# Patient Record
(demographics unavailable — no encounter records)

---

## 2024-11-14 NOTE — PHYSICAL EXAM

## 2024-11-14 NOTE — HISTORY OF PRESENT ILLNESS
[FreeTextEntry1] : Chief complaint: abdominal discomfort, constipation, dyspepsia   HPI: Patient presents for evaluation and management of multiple complex Gastrointestinal signs and symptoms. Patient with postprandial abdominal distension and discomfort which is without radiation; no exacerbating or ameliorating factors. Associated factors include dyspepsia. Patient without dysphagia or odynophagia.   Most likely patient with acute exacerbation of spastic colitis.   Multiple reports reviewed with patient including colonoscopy report and histopathology report   New prescription for IBS rela 50 mg orally daily   High fiber diet reviewed with patient  Moderate degree of complexity of medical decision making involved in this patient encounter

## 2024-11-14 NOTE — ASSESSMENT
[FreeTextEntry1] : Patient presents for evaluation and management of multiple complex gastrointestinal signs and symptoms   Intermittent crampy abdominal distension and constipation   I reviewed with the patient multiple reports including colonoscopy and histopathology reports  New medication prescribed, ibs-rela  Moderate degree of complexity of medical decision making involved in this patient encounter

## 2025-01-02 NOTE — HISTORY OF PRESENT ILLNESS
[FreeTextEntry1] : Chief complaint: constipation, bloating   HPI:Patient presents for followup of multiple complex gastrointestinal signs and symptoms. Last visit, she was placed on ibs rela for her flare of spastic colitis, and she returns today stating that she feels significantly better.   No sign of acute gastrointestinal bleeding such as hematemesis, melena or hematochezia, no dysphagia or odynophagia   Refill placed for ibs rela   problem list, medications and allergies were reviewed and reconciled

## 2025-01-02 NOTE — ASSESSMENT
[FreeTextEntry1] : Patient presents for followup of multiple complex gastrointestinal signs and symptoms   Doing well on ibs rela therapy   Medications, problem list and allergies were reviewed and reconciled

## 2025-03-18 NOTE — PLAN
[FreeTextEntry1] : Ms. HEMA BARBER 69 year  female  with a PMH osteoporosis on Prolia injection, anxiety, SCC, s/p Mohs surgery, IBS, H/o GBS  present to the office for a physical exam and to establish care with a new PCP.  Well adult exam is performed. Recommend  to do a blood test today, further management will depend on the blood test results.  EKG was done and reviewed  NSR 65 bpm, T inversion in lead V1, no acute st-t changes Anxiety continue effexor ER 150mg BID, xanax 0.5mg BID, inderal ER 80mg qd IBS Ibsrela 50mg BID, f/u with GI Osteoporosis  continue prolia, f/u with rheumatologist Do mammo, dexa test H/o skin cancer, actinic keratosis. -  continue to apply to sun screen lotion - f/u with dermatologist  RTC to f/u in 2 wks. Patient is verbalized understanding

## 2025-03-18 NOTE — REVIEW OF SYSTEMS
[TextEntry] : Constitutional: Denies fever, fatigue,  recent changes in the weight Head: Denies headache, dizziness Eyes: Denies diplopia, tearing or pain, c/o dry eyes Ears: Denies earache, tinnitus, hearing loss Nose: Denies nasal obstruction,  epistaxis, has some runny nose Throat: Denies throat pain Neck: Denies stiffness, muscle tenderness Chest: Denies cough, SOB, wheezing, chest congestion CV: Denies chest pain, palpitation GI: Denies abdominal pain, constipation, heartburn Genitourinary: Denies dysuria, urinary urgency Musculoskeletal: Denies joint pain Neuro: Denies changes in mental status Psychiatric: Denies depressive symptoms, change in sleep habits, changes in thought content

## 2025-03-18 NOTE — HISTORY OF PRESENT ILLNESS
[FreeTextEntry1] : New patient [de-identified] : Ms. HEMA BARBER 69 year  female  with a PMH Prediabetes, hyperlipidemia,  osteoporosis on Prolia injection, anxiety, SCC, s/p Mohs surgery, IBS, H/o GBS  present to the office for a physical exam and to establish care with a new PCP.  Patient feels good, denies complaints at present time.

## 2025-03-18 NOTE — HEALTH RISK ASSESSMENT
[Very Good] : ~his/her~  mood as very good [Yes] : Yes [Monthly or less (1 pt)] : Monthly or less (1 point) [1 or 2 (0 pts)] : 1 or 2 (0 points) [Never (0 pts)] : Never (0 points) [No] : In the past 12 months have you used drugs other than those required for medical reasons? No [Any fall with injury in past year] : Patient reported fall with injury in the past year [Little interest or pleasure doing things] : 1) Little interest or pleasure doing things [Feeling down, depressed, or hopeless] : 2) Feeling down, depressed, or hopeless [0] : 2) Feeling down, depressed, or hopeless: Not at all (0) [PHQ-2 Negative - No further assessment needed] : PHQ-2 Negative - No further assessment needed [Patient reported mammogram was normal] : Patient reported mammogram was normal [Patient reported PAP Smear was normal] : Patient reported PAP Smear was normal [HIV test declined] : HIV test declined [Hepatitis C test declined] : Hepatitis C test declined [None] : None [With Family] : lives with family [# of Members in Household ___] :  household currently consist of [unfilled] member(s) [Retired] : retired [College] : College [] :  [# Of Children ___] : has [unfilled] children [Sexually Active] : sexually active [Feels Safe at Home] : Feels safe at home [Fully functional (bathing, dressing, toileting, transferring, walking, feeding)] : Fully functional (bathing, dressing, toileting, transferring, walking, feeding) [Fully functional (using the telephone, shopping, preparing meals, housekeeping, doing laundry, using] : Fully functional and needs no help or supervision to perform IADLs (using the telephone, shopping, preparing meals, housekeeping, doing laundry, using transportation, managing medications and managing finances) [Smoke Detector] : smoke detector [Carbon Monoxide Detector] : carbon monoxide detector [Safety elements used in home] : safety elements used in home [Seat Belt] :  uses seat belt [Sunscreen] : uses sunscreen [With Patient/Caregiver] : , with patient/caregiver [Designated Healthcare Proxy] : Designated healthcare proxy [Name: ___] : Health Care Proxy's Name: [unfilled]  [Relationship: ___] : Relationship: [unfilled] [Aggressive treatment] : aggressive treatment [I will adhere to the patient's wishes.] : I will adhere to the patient's wishes. [Time Spent: ___ minutes] : Time Spent: [unfilled] minutes [Never] : Never [de-identified] : no [de-identified] : dermatologist, ophtalmologist, rheumatologist, GI, gyn [Audit-CScore] : 1 [de-identified] : walking, playin a pick a ball [de-identified] : regular [de-identified] : suffered a Cole fracture of the R foot [[id number]] : 0 [Change in mental status noted] : No change in mental status noted [Language] : denies difficulty with language [Behavior] : denies difficulty with behavior [Learning/Retaining New Information] : denies difficulty learning/retaining new information [Handling Complex Tasks] : denies difficulty handling complex tasks [Reports changes in hearing] : Reports no changes in hearing [Reports changes in vision] : Reports no changes in vision [Reports changes in dental health] : Reports no changes in dental health [MammogramDate] : 2024 [PapSmearDate] : 02/2025 [BoneDensityDate] : 08/10/2023 [BoneDensityComments] : Osteopenia of  FN T score -2.2, TH T score -1.8, Spine T score -1.1 [ColonoscopyDate] : 10/22/2024 [ColonoscopyComments] : IH, diverticula of the sigmoid colon,   s/p polypectomy(tubular adenoma) [de-identified] : wear reading glasses [AdvancecareDate] : 03/18/2025 [FreeTextEntry4] : Consuelo's phone #[phone]

## 2025-03-18 NOTE — PHYSICAL EXAM
[TextEntry] : Constitutional: Well  developed, well appearing, not in acute distress Head: Normocephalic, no lesions Eyes: PERRLA, conjunctiva is NL, clear Ear: Ear canal is normal, tympanic membrane is intact Nose: Nasal turbinates are NL Throat: Clear, no exudates, no lesions, tonsils are absent Neck: Supple, no masses Chest: Lungs are clear, no rales, no rhonchi, no wheezing Heart: Regular rate, no murmurs, no rubs, no gallops Breast: b/l breast is symmetric, areola and nipple is NL, breast is multinodular, dense. Axillary LN is NL b/l Abdomen: Soft, no tenderness, no masses, bowel sounds are normal : No CVAT Extremities: FROM, no edema, has an OA changes of the fingers on both hands Musculoskeletal: has no tenderness of the spine, ROM is NL Skin: Fair skin, has an age related skin changes. Has an actinic keratotic lesion of the skin on the trunk, upper lower  extremities Neuro: AAO x 3, no focal neurological deficit. Psychiatric: oriented to person, place, and time and insight and judgment were intact.

## 2025-06-19 NOTE — PHYSICAL EXAM
[TextEntry] : Constitutional: Well developed, well appearing, not in acute distress Head: Normocephalic, no lesions Eyes: PERRLA, conjunctiva is NL, clear Ear: Ear canal is normal, tympanic membrane is intact Nose: Nasal turbinates are NL Throat: Clear, no exudates, no lesions, tonsils are absent Neck: Supple, no masses Chest: Lungs are clear, no rales, no rhonchi, no wheezing Heart: Regular rate, no murmurs Abdomen: Soft, no tenderness : No CVAT Neuro: AAO x 3, no focal neurological deficit.

## 2025-06-19 NOTE — HISTORY OF PRESENT ILLNESS
[FreeTextEntry1] : F/u exam [de-identified] : Ms. HEMA BARBER 69 year female with a PMH Prediabetes, hyperlipidemia, osteoporosis on Prolia injection,  pancreatic cyst,  anxiety, SCC, s/p Mohs surgery,  rosacea, IBS, H/o GBS present to the office for a f/u.  Patient feels good, denies complaints at present time. As  per patient did mammo and breast u/s last week - nl as per patient(order by a GYN)

## 2025-06-19 NOTE — PLAN
[FreeTextEntry1] : Ms. HEMA BARBER 69 year female with a PMH osteoporosis on Prolia injection, anxiety, SCC, s/p Mohs surgery, rosacea,  IBS, H/o GBS present to the office for a f/u. F/u exam is performed. Recommend to do a blood test today, further management will depend on the blood test results. Anxiety continue effexor ER 150mg BID, xanax 0.5mg BID, inderal ER 80mg qd IBS Ibsrela 50mg BID, f/u with GI Osteoporosis continue prolia, f/u with rheumatologist Pancreatic cyst - f/u with  GI Do dexa test H/o skin cancer, actinic keratosis. - continue to apply to sun screen lotion - f/u with dermatologist  RTC to f/u in 2 wks. Patient is verbalized understanding.

## 2025-07-10 NOTE — HISTORY OF PRESENT ILLNESS
[FreeTextEntry1] :  Chief complaint: Flare of spastic colitis   HPI:  Patient presents for Gastroenterology evaluation because of multiple complex Gastrointestinal issues. Patient with spastic colitis with postprandial abdominal bloating and distension as well as constipation.  She has started ibs rela and has significant improvement in her gastrointestinal signs and symptoms.   No sign of acute gi bleeding such as hematemesis, melena or hematochezia. No dyspepsia, dysphagia or odynophagia.  Multiple reports were reviewed with patient including endoscopic and intestinal endoscopy reports as well as histopathology. Patient verbalized understanding. New rx for ibsrela already sent. Medications, allergies, and problem list were reviewed and reconciled.

## 2025-07-10 NOTE — PHYSICAL EXAM
[Alert] : alert [Normal Voice/Communication] : normal voice/communication [Healthy Appearing] : healthy appearing [No Acute Distress] : no acute distress [Sclera] : the sclera and conjunctiva were normal [Hearing Threshold Finger Rub Not [place]] : hearing was normal [Normal Lips/Gums] : the lips and gums were normal [Oropharynx] : the oropharynx was normal [Normal Appearance] : the appearance of the neck was normal [No Neck Mass] : no neck mass was observed [No Respiratory Distress] : no respiratory distress [No Acc Muscle Use] : no accessory muscle use [Respiration, Rhythm And Depth] : normal respiratory rhythm and effort [Auscultation Breath Sounds / Voice Sounds] : lungs were clear to auscultation bilaterally [Heart Rate And Rhythm] : heart rate was normal and rhythm regular [Normal S1, S2] : normal S1 and S2 [Murmurs] : no murmurs [Bowel Sounds] : normal bowel sounds [No Masses] : no abdominal mass palpated [Abdomen Tenderness] : non-tender [Abdomen Soft] : soft [] : no hepatosplenomegaly [Oriented To Time, Place, And Person] : oriented to person, place, and time

## 2025-07-10 NOTE — ASSESSMENT
[FreeTextEntry1] :  ASSESSMENT ibs with constipation   Plan IBS rela as prescribed  high fiber diet Medications, allergies, and problem list were reviewed and reconciled.